# Patient Record
Sex: MALE | Race: BLACK OR AFRICAN AMERICAN | NOT HISPANIC OR LATINO | Employment: FULL TIME | URBAN - METROPOLITAN AREA
[De-identification: names, ages, dates, MRNs, and addresses within clinical notes are randomized per-mention and may not be internally consistent; named-entity substitution may affect disease eponyms.]

---

## 2020-02-26 ENCOUNTER — HISTORICAL (OUTPATIENT)
Dept: ADMINISTRATIVE | Facility: HOSPITAL | Age: 30
End: 2020-02-26

## 2020-02-29 LAB — FINAL CULTURE: NO GROWTH

## 2022-05-01 NOTE — ED PROVIDER NOTES
Patient:   Juan Hernandez             MRN: [id number]            FIN: [id number]               Age:   29 years     Sex:  Male     :  1990   Associated Diagnoses:   STD exposure; Acute UTI   Author:   Zainab Andrew      Basic Information   Time seen: Date 2020, Immediately upon arrival.   History source: Patient.   Arrival mode: Private vehicle.   History limitation: None.   Additional information: Chief Complaint from Nursing Triage Note : Chief Complaint   2020 20:16 CST      Chief Complaint           Pt c/o burning when he urinates and has penile discharge x 3 days.  .   Provider Assignment.   History of Present Illness   The patient presents post exposure to sexually transmitted disease.  The onset was 3  days ago.  The course/duration of symptoms is worsening.  Location: penis. The character of symptoms is discharge: yellow.  The degree of symptoms is minimal.  The exacerbating factor is urination.  Prior episodes: none.  Therapy today: none.  Associated symptoms: dysuria.  Additional history: sexual history: unprotected intercourse and STD exposure.        Review of Systems   Constitutional symptoms:  No fever, no chills.    Skin symptoms:  Negative except as documented in HPI.   Eye symptoms:  Negative except as documented in HPI.   ENMT symptoms:  Negative except as documented in HPI.   Respiratory symptoms:  Negative except as documented in HPI, No shortness of breath,    Cardiovascular symptoms:  Negative except as documented in HPI, no chest pain, no palpitations, no syncope, no peripheral edema.    Gastrointestinal symptoms:  No abdominal pain, no nausea, no vomiting.    Genitourinary symptoms   Musculoskeletal symptoms:  Negative except as documented in HPI.   Neurologic symptoms:  No headache, no dizziness, no altered level of consciousness.    Psychiatric symptoms:  Negative except as documented in HPI.   Endocrine symptoms:  Negative except as documented in HPI.    Hematologic/Lymphatic symptoms:  Negative except as documented in HPI.   Allergy/immunologic symptoms:  Negative except as documented in HPI.             Additional review of systems information: All other systems reviewed and otherwise negative.      Health Status   Allergies:    No active allergies have been recorded.,    No qualifying data available  .   Medications:  (Selected)   Inpatient Medications  Ordered  Rocephin 250 mg injection: 250 mg, form: Injection, IM, Once, first dose 02/26/20 21:42:00 CST, stop date 02/26/20 21:42:00 CST, STAT  azithromycin 250 mg oral tablet: 1,000 mg, form: Tab, Oral, Once, first dose 02/26/20 21:42:00 CST, stop date 02/26/20 21:42:00 CST, STAT.      Past Medical/ Family/ Social History   Medical history:    No active or resolved past medical history items have been selected or recorded., Reviewed as documented in chart.   Surgical history:    No active procedure history items have been selected or recorded., Reviewed as documented in chart.   Family history:    No family history items have been selected or recorded., Reviewed as documented in chart.   Social history:    Social & Psychosocial Habits    Tobacco  02/26/2020  Use: Never (less than 100 in l    Patient Wants Consult For Cessation Counseling No    Abuse/Neglect  02/26/2020  SHX Any signs of abuse or neglect No  , Reviewed as documented in chart.   Problem list:    Active Problems (1)  No Chronic Problems   .      Physical Examination               Vital Signs   Vital Signs   2/26/2020 20:16 CST      Temperature Temporal Artery               37.2 DegC                             Peripheral Pulse Rate     63 bpm                             Respiratory Rate          16 br/min                             SpO2                      100 %                             Oxygen Therapy            Room air                             Systolic Blood Pressure   127 mmHg                             Diastolic Blood Pressure  69  mmHg  .      Vital Signs (last 24 hrs)_____  Last Charted___________  Heart Rate Peripheral   63 bpm  (FEB 26 20:16)  Resp Rate         16 br/min  (FEB 26 20:16)  SBP      127 mmHg  (FEB 26 20:16)  DBP      69 mmHg  (FEB 26 20:16)  SpO2      100 %  (FEB 26 20:16)  Weight      69.1 kg  (FEB 26 20:16)  Height      179 cm  (FEB 26 20:16)  BMI      21.57  (FEB 26 20:16)  .   Measurements   2/26/2020 20:16 CST      Weight Dosing             69.1 kg                             Weight Measured           69.1 kg                             Weight Measured and Calculated in Lbs     152.34 lb                             Height/Length Dosing      179 cm                             Height/Length Measured    179 cm                             Body Mass Index Measured  21.57 kg/m2  .   Basic Oxygen Information   2/26/2020 20:16 CST      SpO2                      100 %                             Oxygen Therapy            Room air  .   General:  Alert, no acute distress.    Skin:  Warm, intact, normal for ethnicity.    Head:  Normocephalic, atraumatic.    Eye:  Normal conjunctiva.   Cardiovascular:  Regular rate and rhythm, Normal peripheral perfusion.    Respiratory:  Lungs are clear to auscultation, respirations are non-labored, Symmetrical chest wall expansion.    Chest wall   Gastrointestinal:  Nontender, Non distended, Normal bowel sounds.    Genitourinary:  Exam deferred.   Neurological:  Alert and oriented to person, place, time, and situation, No focal neurological deficit observed, normal sensory observed, normal motor observed, normal speech observed.    Lymphatics:  No lymphadenopathy.   Psychiatric:  Cooperative, appropriate mood & affect.       Medical Decision Making   Differential Diagnosis:  Urethritis, cystitis, urinary tract infection, pyelonephritis, venereal disease.    Documents reviewed:  Emergency department nurses' notes, emergency department records, prior records.    Results review:  Lab results : Lab  View   2/26/2020 20:35 CST      UA Appear                 Cloudy                             UA Color                  YELLOW                             UA Spec Grav              1.033  HI                             UA Bili                   Negative                             UA pH                     6.5                             UA Urobilinogen           4 mg/dL                             UA Blood                  0.06 mg/dL                             UA Glucose                Negative                             UA Ketones                Trace                             UA Protein                70 mg/dL                             UA Nitrite                Negative                             UA Leuk Est               500 Vaughn/uL                             UA WBC Interp             >=100 /HPF                             UA RBC Interp             26-50 /HPF                             UA Bact Interp            None Seen /HPF                             UA Squam Epi Interp       1-2 /LPF                             UA Hyal Cast Interp       6-10 /LPF                             Chlam trach PCR           NOT DETECTED                             N. gonorrhea PCR          DETECTED    , Interpretation Abnormal results  Gonorrhea +, UA RBC, WBC, Leuk est.       Reexamination/ Reevaluation   Vital signs   Basic Oxygen Information   2/26/2020 20:16 CST      SpO2                      100 %                             Oxygen Therapy            Room air     Course: progressing as expected, well controlled.   Interventions: Order Profile (Selected)   Inpatient Orders  Ordered  Rocephin 250 mg injection: 250 mg, form: Injection, IM, Once, first dose 02/26/20 21:42:00 CST, stop date 02/26/20 21:42:00 CST, STAT  azithromycin 250 mg oral tablet: 1,000 mg, form: Tab, Oral, Once, first dose 02/26/20 21:42:00 CST, stop date 02/26/20 21:42:00 CST, STAT  .      Impression and Plan   Diagnosis   STD exposure (VRA08-GO Z20.2)    Acute UTI (KEV35-OM N39.0)   Plan   Condition: Improved, Stable.    Disposition: Medically cleared, Discharged: Time  2/26/2020 21:50:00, to home.    Prescriptions: Launch prescriptions   Pharmacy:  Macrobid 100 mg oral capsule (Prescribe): 100 mg = 1 cap(s), Oral, BID, X 7 day(s), # 14 cap(s), 0 Refill(s), Pharmacy: [facility]/pharmacy #[id number], 179, cm, Height/Length Dosing, 2/26/2020 20:16 CST, 69.1, kg, Weight Dosing, 2/26/2020 20:16 CST.    Patient was given the following educational materials: Urinary Tract Infection, Adult, Safe Sex.    Follow up with: Report to Emergency Department if symptoms return or worsen; Follow up with PCP.    Counseled: Patient, Regarding diagnosis, Regarding diagnostic results, Regarding treatment plan, Regarding prescription, Patient indicated understanding of instructions.    Sexually transmitted disease prophylaxis   Orders: Launch Orders   Admit/Transfer/Discharge:  Discharge (Order): 2/26/2020 21:50 CST, Home.       Addendum      Teaching-Supervisory Addendum-Brief   Notes: I agree with mid-level's assessment and plan.  Chart was reviewed in its entirety.  Patient was not seen .

## 2022-05-05 ENCOUNTER — HOSPITAL ENCOUNTER (EMERGENCY)
Facility: HOSPITAL | Age: 32
Discharge: HOME | End: 2022-05-05
Attending: STUDENT IN AN ORGANIZED HEALTH CARE EDUCATION/TRAINING PROGRAM
Payer: COMMERCIAL

## 2022-05-05 VITALS
RESPIRATION RATE: 18 BRPM | WEIGHT: 174 LBS | HEIGHT: 71 IN | DIASTOLIC BLOOD PRESSURE: 88 MMHG | TEMPERATURE: 98 F | OXYGEN SATURATION: 100 % | BODY MASS INDEX: 24.36 KG/M2 | HEART RATE: 99 BPM | SYSTOLIC BLOOD PRESSURE: 126 MMHG

## 2022-05-05 DIAGNOSIS — K40.90 INGUINAL HERNIA, LEFT: Primary | ICD-10-CM

## 2022-05-05 LAB
ALBUMIN SERPL-MCNC: 4.1 GM/DL (ref 3.5–5)
ALBUMIN/GLOB SERPL: 0.9 RATIO (ref 1.1–2)
ALP SERPL-CCNC: 93 UNIT/L (ref 40–150)
ALT SERPL-CCNC: 17 UNIT/L (ref 0–55)
APPEARANCE UR: CLEAR
AST SERPL-CCNC: 27 UNIT/L (ref 5–34)
BACTERIA #/AREA URNS AUTO: NORMAL /HPF
BASOPHILS # BLD AUTO: 0.04 X10(3)/MCL (ref 0–0.2)
BASOPHILS NFR BLD AUTO: 0.6 %
BILIRUB UR QL STRIP.AUTO: NEGATIVE MG/DL
BILIRUBIN DIRECT+TOT PNL SERPL-MCNC: 0.1 MG/DL (ref 0–0.5)
BILIRUBIN DIRECT+TOT PNL SERPL-MCNC: 0.3 MG/DL (ref 0–0.8)
BILIRUBIN DIRECT+TOT PNL SERPL-MCNC: 0.4 MG/DL
BUN SERPL-MCNC: 13.4 MG/DL (ref 8.9–20.6)
CALCIUM SERPL-MCNC: 9.8 MG/DL (ref 8.4–10.2)
CHLORIDE SERPL-SCNC: 108 MMOL/L (ref 98–107)
CO2 SERPL-SCNC: 29 MMOL/L (ref 22–29)
COLOR UR AUTO: YELLOW
CREAT SERPL-MCNC: 1.1 MG/DL (ref 0.73–1.18)
EOSINOPHIL # BLD AUTO: 0.73 X10(3)/MCL (ref 0–0.9)
EOSINOPHIL NFR BLD AUTO: 10.2 %
ERYTHROCYTE [DISTWIDTH] IN BLOOD BY AUTOMATED COUNT: 12.7 % (ref 11.5–17)
GLOBULIN SER-MCNC: 4.5 GM/DL (ref 2.4–3.5)
GLUCOSE SERPL-MCNC: 84 MG/DL (ref 74–100)
GLUCOSE UR QL STRIP.AUTO: NEGATIVE MG/DL
HCT VFR BLD AUTO: 45.2 % (ref 42–52)
HGB BLD-MCNC: 14.6 GM/DL (ref 14–18)
IMM GRANULOCYTES # BLD AUTO: 0.02 X10(3)/MCL (ref 0–0.02)
IMM GRANULOCYTES NFR BLD AUTO: 0.3 % (ref 0–0.43)
KETONES UR QL STRIP.AUTO: NEGATIVE MG/DL
LEUKOCYTE ESTERASE UR QL STRIP.AUTO: ABNORMAL UNIT/L
LYMPHOCYTES # BLD AUTO: 2.35 X10(3)/MCL (ref 0.6–4.6)
LYMPHOCYTES NFR BLD AUTO: 32.8 %
MCH RBC QN AUTO: 28.5 PG (ref 27–31)
MCHC RBC AUTO-ENTMCNC: 32.3 MG/DL (ref 33–36)
MCV RBC AUTO: 88.1 FL (ref 80–94)
MONOCYTES # BLD AUTO: 0.57 X10(3)/MCL (ref 0.1–1.3)
MONOCYTES NFR BLD AUTO: 7.9 %
NEUTROPHILS # BLD AUTO: 3.5 X10(3)/MCL (ref 2.1–9.2)
NEUTROPHILS NFR BLD AUTO: 48.2 %
NITRITE UR QL STRIP.AUTO: NEGATIVE
NRBC BLD AUTO-RTO: 0 %
PH UR STRIP.AUTO: 7.5 [PH]
PLATELET # BLD AUTO: 185 X10(3)/MCL (ref 130–400)
PMV BLD AUTO: 11 FL (ref 9.4–12.4)
POTASSIUM SERPL-SCNC: 4 MMOL/L (ref 3.5–5.1)
PROT SERPL-MCNC: 8.6 GM/DL (ref 6.4–8.3)
PROT UR QL STRIP.AUTO: NEGATIVE MG/DL
RBC # BLD AUTO: 5.13 X10(6)/MCL (ref 4.7–6.1)
RBC #/AREA URNS AUTO: NORMAL /HPF
RBC UR QL AUTO: NEGATIVE UNIT/L
SODIUM SERPL-SCNC: 144 MMOL/L (ref 136–145)
SP GR UR STRIP.AUTO: 1.03 (ref 1–1.03)
SQUAMOUS #/AREA URNS AUTO: NORMAL /LPF
UROBILINOGEN UR STRIP-ACNC: 1 MG/DL
WBC # SPEC AUTO: 7.2 X10(3)/MCL (ref 4.5–11.5)
WBC #/AREA URNS AUTO: NORMAL /HPF

## 2022-05-05 PROCEDURE — 85025 COMPLETE CBC W/AUTO DIFF WBC: CPT | Performed by: PHYSICIAN ASSISTANT

## 2022-05-05 PROCEDURE — 99283 EMERGENCY DEPT VISIT LOW MDM: CPT | Mod: 25

## 2022-05-05 PROCEDURE — 81015 MICROSCOPIC EXAM OF URINE: CPT | Performed by: PHYSICIAN ASSISTANT

## 2022-05-05 PROCEDURE — 81003 URINALYSIS AUTO W/O SCOPE: CPT | Performed by: PHYSICIAN ASSISTANT

## 2022-05-05 PROCEDURE — 80053 COMPREHEN METABOLIC PANEL: CPT | Performed by: PHYSICIAN ASSISTANT

## 2022-05-05 PROCEDURE — 36415 COLL VENOUS BLD VENIPUNCTURE: CPT | Performed by: PHYSICIAN ASSISTANT

## 2022-05-05 NOTE — FIRST PROVIDER EVALUATION
Medical screening exam completed.  I have conducted a focused provider triage encounter, findings are as follows:    Brief history of present illness:  32 yo male presents to ED for evaluation lower abdominal pain. Denies any nausea, vomiting, diarrhea, dysuria, or penile discharge         There were no vitals filed for this visit.    Pertinent physical exam:  Alert, afebrile in and NAD. Vital signs reviewed. Stable at this time.    Brief workup plan:  Will get UA and labs.     Preliminary workup initiated; this workup will be continued and followed by the physician or advanced practice provider that is assigned to the patient when roomed.

## 2022-05-06 NOTE — ED PROVIDER NOTES
Encounter Date: 5/5/2022       History     Chief Complaint   Patient presents with    Abdominal Pain     Patient reports lower abd pain for 3 days, denies N/V     Patient presents with left inguinal hernia x 3-4 weeks.     The history is provided by the patient. No  was used.   Abdominal Pain  The current episode started several weeks ago. The problem has not changed since onset.The abdominal pain is located in the suprapubic region. The abdominal pain does not radiate. The severity of the abdominal pain is 3/10. The abdominal pain is relieved by nothing. The abdominal pain is exacerbated by coughing. The other symptoms of the illness do not include fever, shortness of breath or dysuria.   The patient states that she believes she is currently not pregnant. The patient has not had a change in bowel habit. Symptoms associated with the illness do not include constipation or hematuria. Significant associated medical issues do not include GERD or inflammatory bowel disease.     Review of patient's allergies indicates:  No Known Allergies  History reviewed. No pertinent past medical history.  History reviewed. No pertinent surgical history.  History reviewed. No pertinent family history.     Review of Systems   Constitutional: Negative for fever.   Respiratory: Negative for cough and shortness of breath.    Cardiovascular: Negative for chest pain.   Gastrointestinal: Positive for abdominal pain. Negative for constipation.   Genitourinary: Negative for difficulty urinating, dysuria and hematuria.   Musculoskeletal: Negative for gait problem.   Skin: Negative for color change.   Neurological: Negative for dizziness, speech difficulty and headaches.   Psychiatric/Behavioral: Negative for hallucinations and suicidal ideas.   All other systems reviewed and are negative.      Physical Exam     Initial Vitals [05/05/22 1816]   BP Pulse Resp Temp SpO2   133/87 99 18 98.4 °F (36.9 °C) 100 %      MAP       --          Physical Exam    Constitutional: He appears well-developed and well-nourished.   HENT:   Head: Normocephalic.   Eyes: EOM are normal.   Neck: Neck supple.   Normal range of motion.  Cardiovascular: Normal rate, regular rhythm, normal heart sounds and intact distal pulses.   Abdominal: Abdomen is soft.   Small reducible hernia to left groin   Musculoskeletal:         General: Normal range of motion.      Cervical back: Normal range of motion and neck supple.     Neurological: He is alert and oriented to person, place, and time. He has normal strength.   Skin: Skin is warm and dry. Capillary refill takes less than 2 seconds.   Psychiatric: He has a normal mood and affect. His behavior is normal. Judgment and thought content normal.         ED Course   Procedures  Labs Reviewed   COMPREHENSIVE METABOLIC PANEL - Abnormal; Notable for the following components:       Result Value    Chloride 108 (*)     Protein Total 8.6 (*)     Globulin 4.5 (*)     Albumin/Globulin Ratio 0.9 (*)     All other components within normal limits   URINALYSIS, REFLEX TO URINE CULTURE - Abnormal; Notable for the following components:    Leukocyte Esterase, UA Trace (*)     All other components within normal limits   CBC WITH DIFFERENTIAL - Abnormal; Notable for the following components:    MCHC 32.3 (*)     IG# 0.02 (*)     All other components within normal limits   URINALYSIS, MICROSCOPIC - Normal   CBC W/ AUTO DIFFERENTIAL    Narrative:     The following orders were created for panel order CBC auto differential.  Procedure                               Abnormality         Status                     ---------                               -----------         ------                     CBC with Differential[[id number]]        Abnormal            Final result                 Please view results for these tests on the individual orders.          Imaging Results    None          Medications - No data to display                       Clinical  Impression:   Final diagnoses:  [K40.90] Inguinal hernia, left (Primary)          ED Disposition Condition    Discharge Stable        ED Prescriptions     None        Follow-up Information     Follow up With Specialties Details Why Contact Info    [facility]   keep scheduled follow up [address] [facility] [address] [phone]             CATALINO Moran  05/05/22 0584

## 2022-06-14 ENCOUNTER — ANESTHESIA EVENT (OUTPATIENT)
Dept: SURGERY | Facility: HOSPITAL | Age: 32
End: 2022-06-14
Payer: COMMERCIAL

## 2022-06-14 ENCOUNTER — OFFICE VISIT (OUTPATIENT)
Dept: SURGERY | Facility: CLINIC | Age: 32
End: 2022-06-14
Payer: COMMERCIAL

## 2022-06-14 VITALS
WEIGHT: 173.94 LBS | HEART RATE: 64 BPM | SYSTOLIC BLOOD PRESSURE: 125 MMHG | DIASTOLIC BLOOD PRESSURE: 82 MMHG | TEMPERATURE: 98 F | OXYGEN SATURATION: 99 % | HEIGHT: 71 IN | RESPIRATION RATE: 20 BRPM | BODY MASS INDEX: 24.35 KG/M2

## 2022-06-14 DIAGNOSIS — K40.90 LEFT INGUINAL HERNIA: Primary | ICD-10-CM

## 2022-06-14 DIAGNOSIS — Z01.818 PRE-OP TESTING: ICD-10-CM

## 2022-06-14 PROCEDURE — 99215 OFFICE O/P EST HI 40 MIN: CPT | Mod: PBBFAC

## 2022-06-14 RX ORDER — SODIUM CHLORIDE 9 MG/ML
INJECTION, SOLUTION INTRAVENOUS CONTINUOUS
Status: CANCELLED | OUTPATIENT
Start: 2022-06-14

## 2022-06-14 NOTE — LETTER
June 14, 2022    Juan Hernandez  [address]  [facility] [address]         [facility]  [address] [facility] [address]  Phone: [phone] June 14, 2022     Patient: Juan Hernandez   YOB: 1990   Date of Visit: 6/14/2022       To Whom It May Concern:    It is my medical opinion that Juan Hernandez will require a surgery 6/24 and will be on lifting restrictions until 8/7. .    If you have any questions or concerns, please don't hesitate to call.    Sincerely,        Hector Thomas II, MD

## 2022-06-14 NOTE — ANESTHESIA PREPROCEDURE EVALUATION
06/14/2022  Juan Hernandez is a 32 y.o., male with no significant PMHx presents for Lt IHR.     COVID STATUS: TEST DOS   Latest Reference Range & Units 06/22/22 05:58   SARS Coronavirus 2 Antigen Negative  Negative     BETA-BLOCKER: NONE    PAT NURSE PHONE INTERVIEW 6/20/22    PROBLEM LIST:  -  LEFT INGUINAL HERNIA  -  ETOH 1 SHOT/WEEK    AM Rx DOS: NONE    ORDERS -   SURGEON: 5/5/22 CBC, CMP; NO NEW ORDERS  ANESTHESIA: NONE    Pre-op Assessment    I have reviewed the NPO Status.      Review of Systems  Anesthesia Hx:  No problems with previous Anesthesia    Social:  Non-Smoker    Cardiovascular:  Cardiovascular Normal     Pulmonary:  Pulmonary Normal    Renal/:  Renal/ Normal     Hepatic/GI:  Hepatic/GI Normal    Neurological:  Neurology Normal    Endocrine:  Endocrine Normal      Vitals:    06/22/22 0510 06/22/22 0536 06/22/22 0617   BP: 130/78 130/78 130/70   BP Location:   Right arm   Patient Position:   Lying   Pulse:  64 88   Resp:   20   Temp:  36.7 °C (98.1 °F) 36.5 °C (97.7 °F)   TempSrc:  Oral Temporal   SpO2:  100% 100%         Physical Exam  General: Alert, Well nourished and Cooperative    Airway:  Mallampati: II   Mouth Opening: Normal  TM Distance: Normal  Tongue: Normal  Neck ROM: Normal ROM    Dental:  Intact    Chest/Lungs:  Normal Respiratory Rate, Clear to auscultation    Heart:  Rate: Normal  Rhythm: Regular Rhythm  Sounds: Normal      Lab Results   Component Value Date    WBC 7.2 05/05/2022    HGB 14.6 05/05/2022    HCT 45.2 05/05/2022    MCV 88.1 05/05/2022     05/05/2022       CMP  Sodium Level   Date Value Ref Range Status   05/05/2022 144 136 - 145 mmol/L Final     Potassium Level   Date Value Ref Range Status   05/05/2022 4.0 3.5 - 5.1 mmol/L Final     Carbon Dioxide   Date Value Ref Range Status   05/05/2022 29 22 - 29 mmol/L Final     Blood Urea Nitrogen   Date Value  Ref Range Status   05/05/2022 13.4 8.9 - 20.6 mg/dL Final     Creatinine   Date Value Ref Range Status   05/05/2022 1.10 0.73 - 1.18 mg/dL Final     Calcium Level Total   Date Value Ref Range Status   05/05/2022 9.8 8.4 - 10.2 mg/dL Final     Albumin Level   Date Value Ref Range Status   05/05/2022 4.1 3.5 - 5.0 gm/dL Final     Bilirubin Total   Date Value Ref Range Status   05/05/2022 0.4 <=1.5 mg/dL Final     Alkaline Phosphatase   Date Value Ref Range Status   05/05/2022 93 40 - 150 unit/L Final     Aspartate Aminotransferase   Date Value Ref Range Status   05/05/2022 27 5 - 34 unit/L Final     Alanine Aminotransferase   Date Value Ref Range Status   05/05/2022 17 0 - 55 unit/L Final         Anesthesia Plan  Type of Anesthesia, risks & benefits discussed:    Anesthesia Type: Gen Supraglottic Airway  Intra-op Monitoring Plan: Standard ASA Monitors  Post Op Pain Control Plan: IV/PO Opioids PRN  Induction:  IV  Airway Plan: Direct  Informed Consent: Informed consent signed with the Patient and all parties understand the risks and agree with anesthesia plan.  All questions answered.   ASA Score: 1  Day of Surgery Review of History & Physical: H&P Update referred to the surgeon/provider.    Ready For Surgery From Anesthesia Perspective.     .

## 2022-06-14 NOTE — PROGRESS NOTES
Placed in room. Seen by med studentMary Anne  and Dr. Thomas. Spoke with patient. Surgery date is 06/24/2022. RTC in 2 weeks.

## 2022-06-14 NOTE — H&P (VIEW-ONLY)
\[facility]\"" General Surgery Clinic Note      HPI:   33 yo M with no significant PMH presenting with a left inguinal hernia. He first noticed the hernia about 2 months ago. He experiences occasional sharp abdominal pain when the hernia comes out. He has always been able to reduce the it. The hernia is most noticeable with coughing and heavy lifting. He denies fever, chills, nausea/vomiting, change in bowel movements. He has no history of prior abdominal surgeries.     PMH:   No significant PMH    PSH:   None    FamHx:   History reviewed. No pertinent family history.    SocHx:  Tobacco: none  EtOH: occasional  Drug use: none    Allergies:   NKDA    Medications:  None        Objective:  Vitals:    06/14/22 1034   BP: 125/82   Pulse: 64   Resp: 20   Temp: 98.4 °F (36.9 °C)       Physical Exam:  Gen: NAD  Neuro: alert, answering questions appropriately  Resp: nonlabored breathing  Abd: soft, nondistended, nontender  : reducible direct left inguinal hernia palpated with coughing  Ext: moves all extremities symmetrically        A/P:   33 yo M with no significant PMH presenting with a reducible left inguinal hernia without obstructive symptoms.  Pt desires repair.    -consent signed; risks and benefits discussed with patient  -L inguinal hernia repair scheduled for June 24    Mary Anne Milligan, MS3        Addendum  Patient seen and examined.  Agree with above, edited as needed.    Left inguinal hernia.  Direct herniation appreciated on exam.  Had concerns about mesh at first but, after discussion, he is OK with mesh repair.  Plan for open repair 6/24. Discussed r/b/a, written consent obtained.  Given work excuse beginning on date of surgery and six weeks after.    Hector Thomas MD  HO-3, \[facility]\"" General Surgery  06/14/2022

## 2022-06-14 NOTE — PROGRESS NOTES
\[facility]\"" General Surgery Clinic Note      HPI:   31 yo M with no significant PMH presenting with a left inguinal hernia. He first noticed the hernia about 2 months ago. He experiences occasional sharp abdominal pain when the hernia comes out. He has always been able to reduce the it. The hernia is most noticeable with coughing and heavy lifting. He denies fever, chills, nausea/vomiting, change in bowel movements. He has no history of prior abdominal surgeries.     PMH:   No significant PMH    PSH:   None    FamHx:   History reviewed. No pertinent family history.    SocHx:  Tobacco: none  EtOH: occasional  Drug use: none    Allergies:   NKDA    Medications:  None        Objective:  Vitals:    06/14/22 1034   BP: 125/82   Pulse: 64   Resp: 20   Temp: 98.4 °F (36.9 °C)       Physical Exam:  Gen: NAD  Neuro: alert, answering questions appropriately  Resp: nonlabored breathing  Abd: soft, nondistended, nontender  : reducible direct left inguinal hernia palpated with coughing  Ext: moves all extremities symmetrically        A/P:   31 yo M with no significant PMH presenting with a reducible left inguinal hernia without obstructive symptoms.  Pt desires repair.    -consent signed; risks and benefits discussed with patient  -L inguinal hernia repair scheduled for June 24    Mary Anne Milligan, MS3        Addendum  Patient seen and examined.  Agree with above, edited as needed.    Left inguinal hernia.  Direct herniation appreciated on exam.  Had concerns about mesh at first but, after discussion, he is OK with mesh repair.  Plan for open repair 6/24. Discussed r/b/a, written consent obtained.  Given work excuse beginning on date of surgery and six weeks after.    Hector Thomas MD  HO-3, \[facility]\"" General Surgery  06/14/2022

## 2022-06-22 ENCOUNTER — HOSPITAL ENCOUNTER (OUTPATIENT)
Facility: HOSPITAL | Age: 32
Discharge: HOME | End: 2022-06-22
Attending: SURGERY | Admitting: SURGERY
Payer: COMMERCIAL

## 2022-06-22 DIAGNOSIS — K40.90 LEFT INGUINAL HERNIA: ICD-10-CM

## 2022-06-22 LAB
CTP QC/QA: YES
SARS-COV-2 AG RESP QL IA.RAPID: NEGATIVE

## 2022-06-22 PROCEDURE — 25000003 PHARM REV CODE 250: Performed by: STUDENT IN AN ORGANIZED HEALTH CARE EDUCATION/TRAINING PROGRAM

## 2022-06-22 PROCEDURE — 36000707: Performed by: SURGERY

## 2022-06-22 PROCEDURE — 63600175 PHARM REV CODE 636 W HCPCS: Performed by: ANESTHESIOLOGY

## 2022-06-22 PROCEDURE — 71000015 HC POSTOP RECOV 1ST HR: Performed by: SURGERY

## 2022-06-22 PROCEDURE — 25000003 PHARM REV CODE 250: Performed by: SURGERY

## 2022-06-22 PROCEDURE — 71000033 HC RECOVERY, INTIAL HOUR: Performed by: SURGERY

## 2022-06-22 PROCEDURE — 37000009 HC ANESTHESIA EA ADD 15 MINS: Performed by: SURGERY

## 2022-06-22 PROCEDURE — 36000706: Performed by: SURGERY

## 2022-06-22 PROCEDURE — 71000016 HC POSTOP RECOV ADDL HR: Performed by: SURGERY

## 2022-06-22 PROCEDURE — 63600175 PHARM REV CODE 636 W HCPCS: Performed by: NURSE ANESTHETIST, CERTIFIED REGISTERED

## 2022-06-22 PROCEDURE — C1781 MESH (IMPLANTABLE): HCPCS | Performed by: SURGERY

## 2022-06-22 PROCEDURE — 25000003 PHARM REV CODE 250: Performed by: ANESTHESIOLOGY

## 2022-06-22 PROCEDURE — 25000003 PHARM REV CODE 250: Performed by: NURSE ANESTHETIST, CERTIFIED REGISTERED

## 2022-06-22 PROCEDURE — 63600175 PHARM REV CODE 636 W HCPCS: Performed by: STUDENT IN AN ORGANIZED HEALTH CARE EDUCATION/TRAINING PROGRAM

## 2022-06-22 PROCEDURE — C1729 CATH, DRAINAGE: HCPCS | Performed by: SURGERY

## 2022-06-22 PROCEDURE — 63600175 PHARM REV CODE 636 W HCPCS: Performed by: SURGERY

## 2022-06-22 PROCEDURE — 37000008 HC ANESTHESIA 1ST 15 MINUTES: Performed by: SURGERY

## 2022-06-22 DEVICE — MESH KNITTED ST 3 X 6: Type: IMPLANTABLE DEVICE | Site: INGUINAL | Status: FUNCTIONAL

## 2022-06-22 RX ORDER — ONDANSETRON 2 MG/ML
4 INJECTION INTRAMUSCULAR; INTRAVENOUS DAILY PRN
Status: DISCONTINUED | OUTPATIENT
Start: 2022-06-22 | End: 2022-06-22

## 2022-06-22 RX ORDER — BUPIVACAINE HYDROCHLORIDE AND EPINEPHRINE 5; 5 MG/ML; UG/ML
INJECTION, SOLUTION EPIDURAL; INTRACAUDAL; PERINEURAL
Status: DISCONTINUED | OUTPATIENT
Start: 2022-06-22 | End: 2022-06-22 | Stop reason: HOSPADM

## 2022-06-22 RX ORDER — LIDOCAINE HYDROCHLORIDE 20 MG/ML
INJECTION INTRAVENOUS
Status: DISCONTINUED | OUTPATIENT
Start: 2022-06-22 | End: 2022-06-22

## 2022-06-22 RX ORDER — OXYCODONE HYDROCHLORIDE 5 MG/1
5 TABLET ORAL
Status: DISCONTINUED | OUTPATIENT
Start: 2022-06-22 | End: 2022-06-22 | Stop reason: HOSPADM

## 2022-06-22 RX ORDER — MORPHINE SULFATE 10 MG/ML
2 INJECTION INTRAMUSCULAR; INTRAVENOUS; SUBCUTANEOUS EVERY 5 MIN PRN
Status: DISCONTINUED | OUTPATIENT
Start: 2022-06-22 | End: 2022-06-22

## 2022-06-22 RX ORDER — DEXAMETHASONE SODIUM PHOSPHATE 4 MG/ML
INJECTION, SOLUTION INTRA-ARTICULAR; INTRALESIONAL; INTRAMUSCULAR; INTRAVENOUS; SOFT TISSUE
Status: DISCONTINUED | OUTPATIENT
Start: 2022-06-22 | End: 2022-06-22

## 2022-06-22 RX ORDER — KETOROLAC TROMETHAMINE 30 MG/ML
INJECTION, SOLUTION INTRAMUSCULAR; INTRAVENOUS
Status: DISCONTINUED | OUTPATIENT
Start: 2022-06-22 | End: 2022-06-22

## 2022-06-22 RX ORDER — KETAMINE HCL IN 0.9 % NACL 50 MG/5 ML
SYRINGE (ML) INTRAVENOUS
Status: DISCONTINUED | OUTPATIENT
Start: 2022-06-22 | End: 2022-06-22

## 2022-06-22 RX ORDER — FENTANYL CITRATE 50 UG/ML
INJECTION, SOLUTION INTRAMUSCULAR; INTRAVENOUS
Status: DISCONTINUED | OUTPATIENT
Start: 2022-06-22 | End: 2022-06-22

## 2022-06-22 RX ORDER — SODIUM CHLORIDE 9 MG/ML
INJECTION, SOLUTION INTRAVENOUS CONTINUOUS
Status: DISCONTINUED | OUTPATIENT
Start: 2022-06-22 | End: 2022-06-22 | Stop reason: HOSPADM

## 2022-06-22 RX ORDER — CEFAZOLIN SODIUM 2 G/50ML
2 SOLUTION INTRAVENOUS
Status: COMPLETED | OUTPATIENT
Start: 2022-06-22 | End: 2022-06-22

## 2022-06-22 RX ORDER — BUPIVACAINE HYDROCHLORIDE AND EPINEPHRINE 5; 5 MG/ML; UG/ML
INJECTION, SOLUTION EPIDURAL; INTRACAUDAL; PERINEURAL
Status: DISCONTINUED
Start: 2022-06-22 | End: 2022-06-22 | Stop reason: HOSPADM

## 2022-06-22 RX ORDER — PROPOFOL 10 MG/ML
VIAL (ML) INTRAVENOUS
Status: DISCONTINUED | OUTPATIENT
Start: 2022-06-22 | End: 2022-06-22

## 2022-06-22 RX ORDER — MEPERIDINE HYDROCHLORIDE 25 MG/ML
12.5 INJECTION INTRAMUSCULAR; INTRAVENOUS; SUBCUTANEOUS EVERY 10 MIN PRN
Status: DISCONTINUED | OUTPATIENT
Start: 2022-06-22 | End: 2022-06-22

## 2022-06-22 RX ORDER — ONDANSETRON 2 MG/ML
INJECTION INTRAMUSCULAR; INTRAVENOUS
Status: DISCONTINUED | OUTPATIENT
Start: 2022-06-22 | End: 2022-06-22

## 2022-06-22 RX ORDER — HEPARIN SODIUM 5000 [USP'U]/ML
5000 INJECTION, SOLUTION INTRAVENOUS; SUBCUTANEOUS ONCE
Status: CANCELLED | OUTPATIENT
Start: 2022-06-22

## 2022-06-22 RX ORDER — HEPARIN SODIUM 5000 [USP'U]/ML
5000 INJECTION, SOLUTION INTRAVENOUS; SUBCUTANEOUS ONCE
Status: COMPLETED | OUTPATIENT
Start: 2022-06-22 | End: 2022-06-22

## 2022-06-22 RX ORDER — MIDAZOLAM HYDROCHLORIDE 1 MG/ML
2 INJECTION INTRAMUSCULAR; INTRAVENOUS ONCE
Status: COMPLETED | OUTPATIENT
Start: 2022-06-22 | End: 2022-06-22

## 2022-06-22 RX ADMIN — SODIUM CHLORIDE: 9 INJECTION, SOLUTION INTRAVENOUS at 06:06

## 2022-06-22 RX ADMIN — Medication 10 MG: at 08:06

## 2022-06-22 RX ADMIN — Medication 30 MG: at 07:06

## 2022-06-22 RX ADMIN — HEPARIN SODIUM 5000 UNITS: 5000 INJECTION, SOLUTION INTRAVENOUS; SUBCUTANEOUS at 06:06

## 2022-06-22 RX ADMIN — FENTANYL CITRATE 25 MCG: 50 INJECTION, SOLUTION INTRAMUSCULAR; INTRAVENOUS at 07:06

## 2022-06-22 RX ADMIN — MIDAZOLAM HYDROCHLORIDE 2 MG: 1 INJECTION, SOLUTION INTRAMUSCULAR; INTRAVENOUS at 06:06

## 2022-06-22 RX ADMIN — DEXAMETHASONE SODIUM PHOSPHATE 8 MG: 4 INJECTION, SOLUTION INTRA-ARTICULAR; INTRALESIONAL; INTRAMUSCULAR; INTRAVENOUS; SOFT TISSUE at 07:06

## 2022-06-22 RX ADMIN — KETOROLAC TROMETHAMINE 30 MG: 30 INJECTION, SOLUTION INTRAMUSCULAR; INTRAVENOUS at 07:06

## 2022-06-22 RX ADMIN — ONDANSETRON 4 MG: 2 INJECTION INTRAMUSCULAR; INTRAVENOUS at 08:06

## 2022-06-22 RX ADMIN — FENTANYL CITRATE 50 MCG: 50 INJECTION, SOLUTION INTRAMUSCULAR; INTRAVENOUS at 07:06

## 2022-06-22 RX ADMIN — FENTANYL CITRATE 25 MCG: 50 INJECTION, SOLUTION INTRAMUSCULAR; INTRAVENOUS at 08:06

## 2022-06-22 RX ADMIN — CEFAZOLIN SODIUM 2 G: 2 SOLUTION INTRAVENOUS at 07:06

## 2022-06-22 RX ADMIN — LIDOCAINE HYDROCHLORIDE 100 MG: 20 INJECTION INTRAVENOUS at 07:06

## 2022-06-22 RX ADMIN — OXYCODONE HYDROCHLORIDE 5 MG: 5 TABLET ORAL at 10:06

## 2022-06-22 RX ADMIN — PROPOFOL 120 MG: 10 INJECTION, EMULSION INTRAVENOUS at 07:06

## 2022-06-22 RX ADMIN — MEPERIDINE HYDROCHLORIDE 12.5 MG: 25 INJECTION INTRAMUSCULAR; INTRAVENOUS; SUBCUTANEOUS at 09:06

## 2022-06-22 NOTE — OP NOTE
[facility]  General Surgery  Operative Note    SUMMARY     Date of Procedure: 2022     Procedure: open left inguinal repair     Surgeon(s) and Role:  Staff: Randy Mujica  Resident(s): Marshall Smith MD    Pre-Operative Diagnosis: inguinal hernia     Post-Operative Diagnosis: Same    Anesthesia: GETA    Operative Findings: small fat containing left sided indirect hernia     Description of Technical Procedures:   The patient was identified in the pre-op holding area by name, , and MRN. After verifying that written informed consent had been obtained, the patient was taken to the OR and placed on the table in the supine position. GETA was administered by anesthesia w/o complications. The left groin was prepped and draped in the usual sterile fashion. A standard time-out was performed, again identifying the correct patient and procedure to be performed.     A scalpel was used to make an oblique skin incision, parallel and superior to the inguinal ligament. This was deepened through Dennyss and Campers fascia using electrocautery to the level of the external oblique aponeurosis. The external oblique aponeurosis was opened in the direction of its fibers through the external ring using a scalpel and metzenbaum scissors. The ilioinguinal nerve was identified and protected from injury. The inguinal floor was exposed by creating superior and inferior flaps of the external oblique.     The spermatic cord was identified, mobilized at the pubic tubercle, and isolated using a Penrose drain. Cremasteric fibers were dissected from the cord. The anteromedial aspect of the cord was examined and an indirect hernia sac was identified. The sac was carefully dissected free of the cord down to the level of the internal ring. The vas deferens and testicular vessels were identified and protected during the remainder of the operation.    The sac was opened [and the contents were reduced into the peritoneal  cavity]. The sac was twisted and suture ligated with 2-0 silk suture. Any redundant sac was excised using electrocautery. The stump of the sac was checked for  hemostasis and allowed to retract into the abdomen.      The floor of the inguinal canal was assessed digitally and found to be intact. To repair the floor of the canal, a polypropylene mesh was cut to size in an oval with a longitudinal lateral opening. Starting at the pubic tubercle, the mesh was secured flat with interrupted 2-0 prolene sutures to the reflected edge of the inguinal ligament inferiorly, the conjoint tendon superiorly. The ends of the patch were draped around the cord structures at the level of the internal ring. The Penrose drain was removed and the cord itself was returned to its anatomic location above the mesh.      Hemostasis was achieved using electrocautery. The external oblique aponeurosis was re- approximated using a continuous 3-0 vicryl suture, paying particular attention to the ilioinguinal nerve to protect it from injury. Dennyss fascia was closed with running 3-0 vicryl sutures, and the skin was closed using a 4-0 subcuticular continuous monofilament suture. The operative field was cleaned and dried. The testes were gently pulled down into its anatomic position in the scrotum.    All instrument and sponge counts performed were correct. The patient awakened from anesthesia and was transferred to the PACU in stable condition.     All suture, needle, and instrument counts were correct x2 at the conclusion of the case.     Dr. Mujica was present for all critical portions of the case.     Estimated Blood Loss (EBL): 10 cc           Implants: polypropylene mesh     Specimens: hernia sac     Complications: None            Condition: stable     Disposition: discharge home     Marshall Duenas MD   [employer] General Surgery, PGY1  06/22/2022 11:58 AM

## 2022-06-22 NOTE — ANESTHESIA POSTPROCEDURE EVALUATION
Anesthesia Post Evaluation    Patient: Juan Hernandez    Procedure(s) Performed: Procedure(s) (LRB):  REPAIR, HERNIA, INGUINAL (Left)    Final Anesthesia Type: general      Patient location during evaluation: DOSC  Level of consciousness: awake  Post-procedure vital signs: reviewed and stable      Anesthetic complications: no      Cardiovascular status: hemodynamically stable  Respiratory status: spontaneous ventilation  Follow-up not needed.          Vitals Value Taken Time   /81 06/22/22 0935   Temp 36.5 °C (97.7 °F) 06/22/22 0935   Pulse 69 06/22/22 0935   Resp 16 06/22/22 0935   SpO2 99 % 06/22/22 0935         Event Time   Out of Recovery 09:42:00         Pain/Roderick Score: Pain Rating Prior to Med Admin: 2 (6/22/2022  9:12 AM)  Pain Rating Post Med Admin: 0 (6/22/2022  9:15 AM)  Roderick Score: 8 (6/22/2022  9:25 AM)

## 2022-06-22 NOTE — TRANSFER OF CARE
Anesthesia Transfer of Care Note    Patient: Juan Hernandez    Procedure(s) Performed: Procedure(s) (LRB):  REPAIR, HERNIA, INGUINAL (Left)    Patient location: PACU    Anesthesia Type: general    Transport from OR: Transported from OR on room air with adequate spontaneous ventilation    Post pain: adequate analgesia    Post assessment: no apparent anesthetic complications and tolerated procedure well    Post vital signs: stable    Level of consciousness: awake and sedated    Nausea/Vomiting: no nausea/vomiting    Complications: none    Transfer of care protocol was followed      Last vitals:   Visit Vitals  /70 (BP Location: Right arm, Patient Position: Lying)   Pulse 88   Temp 36.5 °C (97.7 °F) (Temporal)   Resp 20   SpO2 100%

## 2022-06-22 NOTE — DISCHARGE SUMMARY
[facility]  Discharge Note  Short Stay    Procedure(s) (LRB):  REPAIR, HERNIA, INGUINAL (Left)    OUTCOME: Patient tolerated treatment/procedure well without complication and is now ready for discharge.    DISPOSITION: Home or Self Care    FINAL DIAGNOSIS:  <principal problem not specified>    FOLLOWUP: In clinic    DISCHARGE INSTRUCTIONS:  No discharge procedures on file.     TIME SPENT ON DISCHARGE 15 minutes

## 2022-06-22 NOTE — LETTER
June 22, 2022    Juan Hernandez  [address]  [facility] [address]            [address] [facility] [address]  Phone: [phone]  Fax: [phone] June 22, 2022     Patient: Juan Hernandez   YOB: 1990   Date of Visit: 6/22/2022       To Whom It May Concern:    It is my medical opinion that Juan Hernandez be excused from work for 2 weeks beginning 6/22/2022. Patient was under my care and required surgery. He will follow up in our clinic in 2 weeks and we will reassess ability to return to work.     If you have any questions or concerns, please don't hesitate to call.    Sincerely,        Marshall Duenas MD

## 2022-06-22 NOTE — ANESTHESIA PROCEDURE NOTES
Intubation    Date/Time: 6/22/2022 7:08 AM  Performed by: Joleen Cannon CRNA  Authorized by: Chasity Arnold MD     Intubation:     Induction:  Intravenous    Intubated:  Postinduction    Mask Ventilation:  Easy mask    Attempts:  1    Attempted By:  Student (SURI Ling)    Difficult Airway Encountered?: No      Complications:  None    Airway Device:  Supraglottic airway/LMA    Airway Device Size:  4.0    Placement Verified By:  Capnometry    Complicating Factors:  None    Findings Post-Intubation:  BS equal bilateral and atraumatic/condition of teeth unchanged

## 2022-06-23 VITALS
HEART RATE: 57 BPM | TEMPERATURE: 98 F | OXYGEN SATURATION: 99 % | RESPIRATION RATE: 17 BRPM | SYSTOLIC BLOOD PRESSURE: 129 MMHG | DIASTOLIC BLOOD PRESSURE: 91 MMHG

## 2022-06-23 LAB
ESTROGEN SERPL-MCNC: NORMAL PG/ML
INSULIN SERPL-ACNC: NORMAL U[IU]/ML
LAB AP CLINICAL INFORMATION: NORMAL
LAB AP GROSS DESCRIPTION: NORMAL
LAB AP REPORT FOOTNOTES: NORMAL
T3RU NFR SERPL: NORMAL %

## 2022-06-24 ENCOUNTER — ANESTHESIA (OUTPATIENT)
Dept: SURGERY | Facility: HOSPITAL | Age: 32
End: 2022-06-24
Payer: COMMERCIAL

## 2022-07-07 ENCOUNTER — OFFICE VISIT (OUTPATIENT)
Dept: SURGERY | Facility: CLINIC | Age: 32
End: 2022-07-07
Payer: COMMERCIAL

## 2022-07-07 VITALS
TEMPERATURE: 98 F | RESPIRATION RATE: 20 BRPM | WEIGHT: 168.81 LBS | BODY MASS INDEX: 23.63 KG/M2 | OXYGEN SATURATION: 98 % | HEIGHT: 71 IN | DIASTOLIC BLOOD PRESSURE: 90 MMHG | HEART RATE: 67 BPM | SYSTOLIC BLOOD PRESSURE: 144 MMHG

## 2022-07-07 DIAGNOSIS — K40.90 LEFT INGUINAL HERNIA: Primary | ICD-10-CM

## 2022-07-07 PROCEDURE — 99214 OFFICE O/P EST MOD 30 MIN: CPT | Mod: PBBFAC | Performed by: SURGERY

## 2022-07-07 RX ORDER — HYDROCODONE BITARTRATE AND ACETAMINOPHEN 5; 325 MG/1; MG/1
1 TABLET ORAL EVERY 6 HOURS PRN
COMMUNITY
Start: 2022-06-23

## 2022-07-07 NOTE — LETTER
July 7, 2022    Juan Hernandez  [address]  [facility] [address]         [facility]  [address] [facility] [address]  Phone: [phone] July 7, 2022     Patient: Juan Hernandez   YOB: 1990   Date of Visit: 7/7/2022       To Whom It May Concern:    It is my medical opinion that Juan Hernandez may return to light duty immediately with the following restrictions: No lifting greater than 10lbs until 6 weeks post surgery. 8/3/22.    If you have any questions or concerns, please don't hesitate to call.    Sincerely,        Parish Jacobson MD

## 2022-07-07 NOTE — PROGRESS NOTES
"Surgery Clinic Note     CC: S/p Open L Inguinal hernia repair 6/22/2022    HPI:  32-year-old Male s/p Open L inguinal hernia repair.    Denies f/c/n/v/CP/SOB. Denies any pain, pleased with his surgery.    PMH: N/A    PSH: L open inguinal hernia repair    Fam Hx: none    Social Hx: Non-smoker    Allergies: None    ROS: Negative except above     Current Outpatient Medications on File Prior to Visit   Medication Sig Dispense Refill    HYDROcodone-acetaminophen (NORCO) 5-325 mg per tablet Take 1 tablet by mouth every 6 (six) hours as needed.       No current facility-administered medications on file prior to visit.       Physical Exam  BP (!) 144/90 (BP Location: Left arm, Patient Position: Sitting, BP Method: Medium (Automatic))   Pulse 67   Temp 98.2 °F (36.8 °C) (Oral)   Resp 20   Ht 5' 10.87" (1.8 m)   Wt 76.6 kg (168 lb 12.8 oz)   SpO2 98%   BMI 23.63 kg/m²   General: NAD, AAO X 3  Resp: Clear to ascultation bilaterally  Abdomen: soft, non-tender, non-distended  Groin: Incision well healing, no erythema/drainage/dehiscense present. Scrotum absent of hernia.                                Surgical Pathology: Negative for any malignancy.    ASSESSMENT/PLAN  32M s/p Open L Inguinal Hernia Repair 6/22/22.     -Pt given note releasing him back to work with the restriction of not lifting more than 10lbs for 6 weeks post-op.   -RTC PRN    Parish Jacobson MD  [employer] Surgery PGY1      "

## (undated) DEVICE — DRAIN PENROSE XRAY 18 X 1 ST

## (undated) DEVICE — HANDLE DEVON RIGID OR LIGHT

## (undated) DEVICE — GLOVE PROTEXIS PI SYN SURG 7.5

## (undated) DEVICE — APPLICATOR CHLORAPREP ORN 26ML

## (undated) DEVICE — SOL NACL IRR 1000ML BTL

## (undated) DEVICE — MANIFOLD 4 PORT

## (undated) DEVICE — ADHESIVE DERMABOND ADVANCED

## (undated) DEVICE — GLOVE PROTEXIS LTX MICRO  7.5

## (undated) DEVICE — GLOVE PROTEXIS HYDROGEL SZ8

## (undated) DEVICE — SUT 2/0 30IN SILK BLK BRAI

## (undated) DEVICE — Device

## (undated) DEVICE — SUT MCRYL PLUS 4-0 PS2 27IN

## (undated) DEVICE — SUT ABSRB PDS II 2-0 SH 27IN

## (undated) DEVICE — NDL SAFETY 21G X 1 1/2 ECLPSE

## (undated) DEVICE — SUT SA85H SILK 2-0

## (undated) DEVICE — SPONGE KITTNER 1/4X 5/8 L STRL

## (undated) DEVICE — SYR 10CC LUER LOCK

## (undated) DEVICE — SUT 3-0 VICRYL / SH (J416)

## (undated) DEVICE — CLIPPER BLADE MOD 4406 (CAREF)

## (undated) DEVICE — SUT 2-0 VICRYL / SH (J417)

## (undated) DEVICE — GLOVE PROTEXIS BLUE LATEX 7.5